# Patient Record
Sex: MALE | Employment: FULL TIME | URBAN - METROPOLITAN AREA
[De-identification: names, ages, dates, MRNs, and addresses within clinical notes are randomized per-mention and may not be internally consistent; named-entity substitution may affect disease eponyms.]

---

## 2017-07-04 ENCOUNTER — APPOINTMENT (OUTPATIENT)
Dept: GENERAL RADIOLOGY | Age: 32
End: 2017-07-04
Attending: EMERGENCY MEDICINE
Payer: SELF-PAY

## 2017-07-04 ENCOUNTER — HOSPITAL ENCOUNTER (EMERGENCY)
Age: 32
Discharge: HOME | End: 2017-07-04
Attending: EMERGENCY MEDICINE | Admitting: EMERGENCY MEDICINE
Payer: SELF-PAY

## 2017-07-04 VITALS
RESPIRATION RATE: 16 BRPM | TEMPERATURE: 97.9 F | WEIGHT: 210 LBS | HEIGHT: 74 IN | DIASTOLIC BLOOD PRESSURE: 74 MMHG | BODY MASS INDEX: 26.95 KG/M2 | SYSTOLIC BLOOD PRESSURE: 126 MMHG | HEART RATE: 52 BPM | OXYGEN SATURATION: 99 %

## 2017-07-04 DIAGNOSIS — M79.89 SWELLING OF RIGHT HAND: Primary | ICD-10-CM

## 2017-07-04 PROCEDURE — 73140 X-RAY EXAM OF FINGER(S): CPT

## 2017-07-04 PROCEDURE — 99282 EMERGENCY DEPT VISIT SF MDM: CPT

## 2017-07-04 RX ORDER — MELOXICAM 7.5 MG/1
7.5 TABLET ORAL DAILY
Qty: 14 TAB | Refills: 0 | Status: SHIPPED | OUTPATIENT
Start: 2017-07-04 | End: 2022-10-13

## 2017-07-04 NOTE — ED NOTES
I have reviewed discharge instructions with the patient. The patient verbalized understanding. Pt in nad ambulatory to ED lobby. Pt agreeable to dc plan.

## 2017-07-04 NOTE — ED PROVIDER NOTES
HPI Comments: 1:27 AM Allen Macedo is a 27 y.o. Male who presents to ED for the evaluation of right middle finger pain secondary to an injury 1 month ago. Pt explains that he was helping to move a couch and banged his hand against the wall, injuring it, and it hasn't improved since. He reports taking Motrin with little relief. No other complaints, associated symptoms or modifying factors at this time. The history is provided by the patient. History reviewed. No pertinent past medical history. History reviewed. No pertinent surgical history. History reviewed. No pertinent family history. Social History     Social History    Marital status: SINGLE     Spouse name: N/A    Number of children: N/A    Years of education: N/A     Occupational History    Not on file. Social History Main Topics    Smoking status: Never Smoker    Smokeless tobacco: Never Used    Alcohol use Yes      Comment: Social     Drug use: No    Sexual activity: Not on file     Other Topics Concern    Not on file     Social History Narrative         ALLERGIES: Review of patient's allergies indicates no known allergies. Review of Systems   Musculoskeletal: Positive for arthralgias and joint swelling. Skin: Negative for color change and wound. Neurological: Negative for numbness. All other systems reviewed and are negative. Vitals:    07/04/17 0022 07/04/17 0103   BP: 126/74    Pulse: (!) 52    Resp: 16    Temp: 97.9 °F (36.6 °C)    SpO2: 99% 99%   Weight: 95.3 kg (210 lb)    Height: 6' 2\" (1.88 m)             Physical Exam   Constitutional: He is oriented to person, place, and time. He appears well-developed and well-nourished. HENT:   Head: Normocephalic and atraumatic. Neck: Neck supple. No JVD present. Musculoskeletal: He exhibits no edema.    R hand: radial pulse 2+  Cap refill 1 sec  Mild swelling 3rd MCP, mild tenderness  Full ROM in hand, able to make fist  Gross sensation intact R hand Neurological: He is alert and oriented to person, place, and time. Skin: Skin is warm and dry. No erythema. Psychiatric: Judgment normal.        MDM  Number of Diagnoses or Management Options  Swelling of right hand:   Diagnosis management comments: 28 y/o male presents with 3rd MCP tenderness and swelling    xr to eval for fx    Normal neuro exam  Will give mobic, ortho follow up. Amount and/or Complexity of Data Reviewed  Tests in the radiology section of CPT®: ordered and reviewed      ED Course       Procedures      Vitals:  Patient Vitals for the past 12 hrs:   Temp Pulse Resp BP SpO2   07/04/17 0103 - - - - 99 %   07/04/17 0022 97.9 °F (36.6 °C) (!) 52 16 126/74 99 %   Patient is 99% O2 on RA, indicating adequate oxygenation. X-Ray, CT or other radiology findings or impressions:  XR 3RD FINGER RT MIN 2 V      As read by myself: No acute process         Progress notes, Consult notes or additional Procedure notes:   Upon initial evaluation the pt has non-toxic appearance and condition is stable for discharge. He was instructed to f/u with Ortho and return to the ED upon worsening of symptoms. All questions and concerns were addressed. Disposition:  Diagnosis:   1. Swelling of right hand        Disposition: Discharged    Follow-up Information     Follow up With Details Comments [facility] MD Jose G Schedule an appointment as soon as possible for a visit For follow up [address] [place] [address]  [phone]      [facility] EMERGENCY DEPT Go to As needed, If symptoms worsen [address]  [phone]           Patient's Medications   Start Taking    MELOXICAM (MOBIC) 7.5 MG TABLET    Take 1 Tab by mouth daily.    Continue Taking    No medications on file   These Medications have changed    No medications on file   Stop Taking    No medications on file       Scribe Attestation:   Amilcar Dugan acting as a scribe for and in the presence of Lolita Meckel, DO July 04, 2017 at 1:26 AM     Signed by: José Miguel Guy, July 04, 2017 at 1:26 AM     Provider Attestation:   I personally performed the services described in the documentation, reviewed the documentation, as recorded by the scribe in my presence, and it accurately and completely records my words and actions.      Reviewed and signed by:  Lolita Meckel, DO

## 2017-07-04 NOTE — DISCHARGE INSTRUCTIONS
Hand Pain: Care Instructions  Your Care Instructions  Common causes of hand pain are overuse and injuries, such as might happen during sports or home repair projects. Everyday wear and tear, especially as you get older, also can cause hand pain. Most minor hand injuries will heal on their own, and home treatment is usually all you need to do. If you have sudden and severe pain, you may need tests and treatment. Follow-up care is a key part of your treatment and safety. Be sure to make and go to all appointments, and call your doctor if you are having problems. Its also a good idea to know your test results and keep a list of the medicines you take. How can you care for yourself at home? · Take pain medicines exactly as directed. ¨ If the doctor gave you a prescription medicine for pain, take it as prescribed. ¨ If you are not taking a prescription pain medicine, ask your doctor if you can take an over-the-counter medicine. · Rest and protect your hand. Take a break from any activity that may cause pain. · Put ice or a cold pack on your hand for 10 to 20 minutes at a time. Put a thin cloth between the ice and your skin. · Prop up the sore hand on a pillow when you ice it or anytime you sit or lie down during the next 3 days. Try to keep it above the level of your heart. This will help reduce swelling. · If your doctor recommends a sling, splint, or elastic bandage to support your hand, wear it as directed. When should you call for help? Call 911 anytime you think you may need emergency care. For example, call if:  · Your hand turns cool or pale or changes color. Call your doctor now or seek immediate medical care if:  · You cannot move your hand. · Your hand pops, moves out of its normal position, and then returns to its normal position. · You have signs of infection, such as:  ¨ Increased pain, swelling, warmth, or redness. ¨ Red streaks leading from the sore area.   ¨ Pus draining from a place on your hand. ¨ A fever. · Your hand feels numb or tingly. Watch closely for changes in your health, and be sure to contact your doctor if:  · Your hand feels unstable when you try to use it. · You do not get better as expected. · You have any new symptoms, such as swelling. · Bruises from an injury to your hand last longer than 2 weeks. Where can you learn more? Go to http://sheeba-juan.info/. Enter R273 in the search box to learn more about \"Hand Pain: Care Instructions. \"  Current as of: March 20, 2017  Content Version: 11.3  © 3404-9376 [employer]. Care instructions adapted under license by [employer] (which disclaims liability or warranty for this information). If you have questions about a medical condition or this instruction, always ask your healthcare professional. [address] any warranty or liability for your use of this information.